# Patient Record
Sex: MALE | Race: WHITE | NOT HISPANIC OR LATINO | Employment: OTHER | URBAN - METROPOLITAN AREA
[De-identification: names, ages, dates, MRNs, and addresses within clinical notes are randomized per-mention and may not be internally consistent; named-entity substitution may affect disease eponyms.]

---

## 2018-08-14 ENCOUNTER — APPOINTMENT (OUTPATIENT)
Dept: LAB | Facility: CLINIC | Age: 73
End: 2018-08-14
Payer: COMMERCIAL

## 2018-08-14 ENCOUNTER — TRANSCRIBE ORDERS (OUTPATIENT)
Dept: ADMINISTRATIVE | Facility: HOSPITAL | Age: 73
End: 2018-08-14

## 2018-08-14 DIAGNOSIS — R60.0 LOCALIZED EDEMA: Primary | ICD-10-CM

## 2018-08-14 DIAGNOSIS — L02.519 CELLULITIS AND ABSCESS OF HAND, EXCEPT FINGERS AND THUMB: ICD-10-CM

## 2018-08-14 DIAGNOSIS — L03.119 CELLULITIS AND ABSCESS OF HAND, EXCEPT FINGERS AND THUMB: ICD-10-CM

## 2018-08-14 DIAGNOSIS — B99.9 DILATED CARDIOMYOPATHY SECONDARY TO INFECTION (HCC): ICD-10-CM

## 2018-08-14 DIAGNOSIS — R60.0 LOCALIZED EDEMA: ICD-10-CM

## 2018-08-14 DIAGNOSIS — I43 DILATED CARDIOMYOPATHY SECONDARY TO INFECTION (HCC): ICD-10-CM

## 2018-08-14 LAB
ALBUMIN SERPL BCP-MCNC: 3.5 G/DL (ref 3.5–5)
ALP SERPL-CCNC: 92 U/L (ref 46–116)
ALT SERPL W P-5'-P-CCNC: 40 U/L (ref 12–78)
ANION GAP SERPL CALCULATED.3IONS-SCNC: 6 MMOL/L (ref 4–13)
AST SERPL W P-5'-P-CCNC: 26 U/L (ref 5–45)
BASOPHILS # BLD AUTO: 0.04 THOUSANDS/ΜL (ref 0–0.1)
BASOPHILS NFR BLD AUTO: 1 % (ref 0–1)
BILIRUB SERPL-MCNC: 1 MG/DL (ref 0.2–1)
BUN SERPL-MCNC: 14 MG/DL (ref 5–25)
CALCIUM SERPL-MCNC: 9 MG/DL (ref 8.3–10.1)
CHLORIDE SERPL-SCNC: 103 MMOL/L (ref 100–108)
CHOLEST SERPL-MCNC: 176 MG/DL (ref 50–200)
CO2 SERPL-SCNC: 27 MMOL/L (ref 21–32)
CREAT SERPL-MCNC: 0.96 MG/DL (ref 0.6–1.3)
DEPRECATED D DIMER PPP: 2060 NG/ML (FEU) (ref 0–424)
EOSINOPHIL # BLD AUTO: 0.09 THOUSAND/ΜL (ref 0–0.61)
EOSINOPHIL NFR BLD AUTO: 1 % (ref 0–6)
ERYTHROCYTE [DISTWIDTH] IN BLOOD BY AUTOMATED COUNT: 13.5 % (ref 11.6–15.1)
GFR SERPL CREATININE-BSD FRML MDRD: 77 ML/MIN/1.73SQ M
GLUCOSE P FAST SERPL-MCNC: 91 MG/DL (ref 65–99)
HCT VFR BLD AUTO: 47.5 % (ref 36.5–49.3)
HDLC SERPL-MCNC: 45 MG/DL (ref 40–60)
HGB BLD-MCNC: 15.4 G/DL (ref 12–17)
IMM GRANULOCYTES # BLD AUTO: 0.03 THOUSAND/UL (ref 0–0.2)
IMM GRANULOCYTES NFR BLD AUTO: 0 % (ref 0–2)
LDLC SERPL CALC-MCNC: 102 MG/DL (ref 0–100)
LYMPHOCYTES # BLD AUTO: 1.57 THOUSANDS/ΜL (ref 0.6–4.47)
LYMPHOCYTES NFR BLD AUTO: 18 % (ref 14–44)
MCH RBC QN AUTO: 30.4 PG (ref 26.8–34.3)
MCHC RBC AUTO-ENTMCNC: 32.4 G/DL (ref 31.4–37.4)
MCV RBC AUTO: 94 FL (ref 82–98)
MONOCYTES # BLD AUTO: 0.57 THOUSAND/ΜL (ref 0.17–1.22)
MONOCYTES NFR BLD AUTO: 6 % (ref 4–12)
NEUTROPHILS # BLD AUTO: 6.54 THOUSANDS/ΜL (ref 1.85–7.62)
NEUTS SEG NFR BLD AUTO: 74 % (ref 43–75)
NONHDLC SERPL-MCNC: 131 MG/DL
NRBC BLD AUTO-RTO: 0 /100 WBCS
PLATELET # BLD AUTO: 189 THOUSANDS/UL (ref 149–390)
PMV BLD AUTO: 12.1 FL (ref 8.9–12.7)
POTASSIUM SERPL-SCNC: 4.2 MMOL/L (ref 3.5–5.3)
PROT SERPL-MCNC: 8.5 G/DL (ref 6.4–8.2)
PSA SERPL-MCNC: 0.5 NG/ML (ref 0–4)
RBC # BLD AUTO: 5.07 MILLION/UL (ref 3.88–5.62)
SODIUM SERPL-SCNC: 136 MMOL/L (ref 136–145)
TRIGL SERPL-MCNC: 143 MG/DL
WBC # BLD AUTO: 8.84 THOUSAND/UL (ref 4.31–10.16)

## 2018-08-14 PROCEDURE — 36415 COLL VENOUS BLD VENIPUNCTURE: CPT

## 2018-08-14 PROCEDURE — 86618 LYME DISEASE ANTIBODY: CPT

## 2018-08-14 PROCEDURE — 80053 COMPREHEN METABOLIC PANEL: CPT

## 2018-08-14 PROCEDURE — 80061 LIPID PANEL: CPT

## 2018-08-14 PROCEDURE — 85379 FIBRIN DEGRADATION QUANT: CPT

## 2018-08-14 PROCEDURE — 86617 LYME DISEASE ANTIBODY: CPT

## 2018-08-14 PROCEDURE — 84153 ASSAY OF PSA TOTAL: CPT

## 2018-08-14 PROCEDURE — 85025 COMPLETE CBC W/AUTO DIFF WBC: CPT

## 2018-08-15 ENCOUNTER — HOSPITAL ENCOUNTER (OUTPATIENT)
Dept: NON INVASIVE DIAGNOSTICS | Facility: CLINIC | Age: 73
Discharge: HOME | End: 2018-08-15
Payer: COMMERCIAL

## 2018-08-15 DIAGNOSIS — R60.0 LOCALIZED EDEMA: ICD-10-CM

## 2018-08-15 LAB
B BURGDOR IGG SER IA-ACNC: 6.1
B BURGDOR IGM SER IA-ACNC: 0.97

## 2018-08-15 PROCEDURE — 93971 EXTREMITY STUDY: CPT

## 2018-08-15 PROCEDURE — 93971 EXTREMITY STUDY: CPT | Performed by: SURGERY

## 2018-08-16 LAB
B BURGDOR IGG PATRN SER IB-IMP: NEGATIVE
B BURGDOR IGM PATRN SER IB-IMP: NEGATIVE
B BURGDOR18KD IGG SER QL IB: ABNORMAL
B BURGDOR23KD IGG SER QL IB: PRESENT
B BURGDOR23KD IGM SER QL IB: PRESENT
B BURGDOR28KD IGG SER QL IB: ABNORMAL
B BURGDOR30KD IGG SER QL IB: ABNORMAL
B BURGDOR39KD IGG SER QL IB: PRESENT
B BURGDOR39KD IGM SER QL IB: ABNORMAL
B BURGDOR41KD IGG SER QL IB: ABNORMAL
B BURGDOR41KD IGM SER QL IB: ABNORMAL
B BURGDOR45KD IGG SER QL IB: ABNORMAL
B BURGDOR58KD IGG SER QL IB: ABNORMAL
B BURGDOR66KD IGG SER QL IB: ABNORMAL
B BURGDOR93KD IGG SER QL IB: ABNORMAL

## 2024-03-31 ENCOUNTER — APPOINTMENT (EMERGENCY)
Dept: CT IMAGING | Facility: HOSPITAL | Age: 79
End: 2024-03-31
Payer: COMMERCIAL

## 2024-03-31 ENCOUNTER — HOSPITAL ENCOUNTER (OUTPATIENT)
Facility: HOSPITAL | Age: 79
Setting detail: OBSERVATION
Discharge: LEFT BEFORE DISCHARGE | End: 2024-03-31
Attending: EMERGENCY MEDICINE | Admitting: INTERNAL MEDICINE
Payer: COMMERCIAL

## 2024-03-31 VITALS
WEIGHT: 200.62 LBS | HEART RATE: 119 BPM | OXYGEN SATURATION: 94 % | SYSTOLIC BLOOD PRESSURE: 131 MMHG | TEMPERATURE: 98.3 F | DIASTOLIC BLOOD PRESSURE: 70 MMHG | RESPIRATION RATE: 19 BRPM

## 2024-03-31 DIAGNOSIS — R29.90 STROKE-LIKE SYMPTOMS: Primary | ICD-10-CM

## 2024-03-31 LAB
ANION GAP SERPL CALCULATED.3IONS-SCNC: 12 MMOL/L (ref 4–13)
APTT PPP: 28 SECONDS (ref 23–37)
BUN SERPL-MCNC: 18 MG/DL (ref 5–25)
CALCIUM SERPL-MCNC: 9.3 MG/DL (ref 8.4–10.2)
CARDIAC TROPONIN I PNL SERPL HS: 17 NG/L
CHLORIDE SERPL-SCNC: 103 MMOL/L (ref 96–108)
CO2 SERPL-SCNC: 23 MMOL/L (ref 21–32)
CREAT SERPL-MCNC: 1.11 MG/DL (ref 0.6–1.3)
ERYTHROCYTE [DISTWIDTH] IN BLOOD BY AUTOMATED COUNT: 13.4 % (ref 11.6–15.1)
FLUAV RNA RESP QL NAA+PROBE: NEGATIVE
FLUBV RNA RESP QL NAA+PROBE: NEGATIVE
GFR SERPL CREATININE-BSD FRML MDRD: 62 ML/MIN/1.73SQ M
GLUCOSE SERPL-MCNC: 149 MG/DL (ref 65–140)
GLUCOSE SERPL-MCNC: 170 MG/DL (ref 65–140)
HCT VFR BLD AUTO: 49.7 % (ref 36.5–49.3)
HGB BLD-MCNC: 16.1 G/DL (ref 12–17)
INR PPP: 1.03 (ref 0.84–1.19)
MCH RBC QN AUTO: 30.6 PG (ref 26.8–34.3)
MCHC RBC AUTO-ENTMCNC: 32.4 G/DL (ref 31.4–37.4)
MCV RBC AUTO: 94 FL (ref 82–98)
PLATELET # BLD AUTO: 190 THOUSANDS/UL (ref 149–390)
PMV BLD AUTO: 12.8 FL (ref 8.9–12.7)
POTASSIUM SERPL-SCNC: 3.5 MMOL/L (ref 3.5–5.3)
PROTHROMBIN TIME: 13.6 SECONDS (ref 11.6–14.5)
RBC # BLD AUTO: 5.27 MILLION/UL (ref 3.88–5.62)
RSV RNA RESP QL NAA+PROBE: NEGATIVE
SARS-COV-2 RNA RESP QL NAA+PROBE: NEGATIVE
SODIUM SERPL-SCNC: 138 MMOL/L (ref 135–147)
WBC # BLD AUTO: 11.94 THOUSAND/UL (ref 4.31–10.16)

## 2024-03-31 PROCEDURE — 85610 PROTHROMBIN TIME: CPT | Performed by: EMERGENCY MEDICINE

## 2024-03-31 PROCEDURE — 84484 ASSAY OF TROPONIN QUANT: CPT | Performed by: EMERGENCY MEDICINE

## 2024-03-31 PROCEDURE — 82948 REAGENT STRIP/BLOOD GLUCOSE: CPT

## 2024-03-31 PROCEDURE — 0241U HB NFCT DS VIR RESP RNA 4 TRGT: CPT | Performed by: EMERGENCY MEDICINE

## 2024-03-31 PROCEDURE — 36415 COLL VENOUS BLD VENIPUNCTURE: CPT | Performed by: EMERGENCY MEDICINE

## 2024-03-31 PROCEDURE — 80048 BASIC METABOLIC PNL TOTAL CA: CPT | Performed by: EMERGENCY MEDICINE

## 2024-03-31 PROCEDURE — 70498 CT ANGIOGRAPHY NECK: CPT

## 2024-03-31 PROCEDURE — 99285 EMERGENCY DEPT VISIT HI MDM: CPT

## 2024-03-31 PROCEDURE — 99285 EMERGENCY DEPT VISIT HI MDM: CPT | Performed by: EMERGENCY MEDICINE

## 2024-03-31 PROCEDURE — 70496 CT ANGIOGRAPHY HEAD: CPT

## 2024-03-31 PROCEDURE — 93005 ELECTROCARDIOGRAM TRACING: CPT

## 2024-03-31 PROCEDURE — 85027 COMPLETE CBC AUTOMATED: CPT | Performed by: EMERGENCY MEDICINE

## 2024-03-31 PROCEDURE — 85730 THROMBOPLASTIN TIME PARTIAL: CPT | Performed by: EMERGENCY MEDICINE

## 2024-03-31 RX ORDER — ASPIRIN 325 MG
325 TABLET ORAL ONCE
Status: DISCONTINUED | OUTPATIENT
Start: 2024-03-31 | End: 2024-03-31

## 2024-03-31 RX ORDER — CLOPIDOGREL BISULFATE 75 MG/1
300 TABLET ORAL ONCE
Status: DISCONTINUED | OUTPATIENT
Start: 2024-03-31 | End: 2024-03-31

## 2024-03-31 RX ORDER — ATORVASTATIN CALCIUM 40 MG/1
40 TABLET, FILM COATED ORAL EVERY EVENING
Status: DISCONTINUED | OUTPATIENT
Start: 2024-03-31 | End: 2024-03-31

## 2024-03-31 RX ORDER — HEPARIN SODIUM 5000 [USP'U]/ML
5000 INJECTION, SOLUTION INTRAVENOUS; SUBCUTANEOUS EVERY 8 HOURS SCHEDULED
Status: DISCONTINUED | OUTPATIENT
Start: 2024-03-31 | End: 2024-03-31

## 2024-03-31 RX ORDER — ASPIRIN 81 MG/1
81 TABLET, CHEWABLE ORAL DAILY
Status: DISCONTINUED | OUTPATIENT
Start: 2024-04-01 | End: 2024-03-31

## 2024-03-31 RX ADMIN — IOHEXOL 85 ML: 350 INJECTION, SOLUTION INTRAVENOUS at 20:01

## 2024-03-31 NOTE — Clinical Note
Case was discussed with mario and the patient's admission status was agreed to be Admission Status: observation status to the service of Dr. martin .

## 2024-03-31 NOTE — QUICK NOTE
NEUROLOGY STROKE ALERT TELEPHONE NOTE    I did not personally see or examine this patient at time of ED presentation, but I discussed the case in real time with the onsite ED provider including pertinent PMHx, presenting symptoms, initial vitals, exam findings, lab findings, neuroimaging findings, and made recommendations regarding further w/u and treatment. I also independently reviewed patient's EMR, vital signs, labs, and interpreted all neuroimaging studies personally as well as discussing them w/ reading radiologist. Please see my following comments for details and adjustments. Critical care time, excluding procedures, teaching, family meetings, and excludes any prior time recorded by providers other than myself, 45 minutes.     78-year-old male w/ no reported PMHx presents to [facility] ED w/ transient RUE weakness.  Reportedly around 17:00 patient developed right-sided weakness.  EMS reportedly noticed RUE drift.  By time of ED arrival symptoms were greatly improved.  Prehospital stroke alert activated at 19:30.  Neurology attempted response immediately, but did not get in touch with ED until 19:55 when patient finally arrived.    LKN: 17:00  AP/AC: None  Initial BP: 171/91  B  Initial NIHSS (per onsite ED provider): 0  CTH: No acute abnormality  CTA head/neck: No LVO    No TNK 2/2 nondisabling and rapidly resolving symptoms. No thrombectomy 2/2 no LVO.  Clinically, presentation is consistent with left hemispheric TIA or minor stroke.  ABCD2 score 6.  Load with DAPT (aspirin 325, Plavix 300) per CHANCE/POINT/INSPIRES trials and admit on stroke pathway.    Zev Oropeza MD  Neurology  24

## 2024-04-01 LAB
ATRIAL RATE: 127 BPM
P AXIS: 56 DEGREES
PR INTERVAL: 166 MS
QRS AXIS: 46 DEGREES
QRSD INTERVAL: 72 MS
QT INTERVAL: 308 MS
QTC INTERVAL: 444 MS
T WAVE AXIS: -43 DEGREES
VENTRICULAR RATE: 125 BPM

## 2024-04-01 PROCEDURE — 93010 ELECTROCARDIOGRAM REPORT: CPT | Performed by: INTERNAL MEDICINE

## 2024-04-01 NOTE — ED PROVIDER NOTES
History  Chief Complaint   Patient presents with    STROKE Alert     1800 started with R sided weakness.     78-year-old male presents with sudden onset right upper extremity weakness which started at 5 PM.  Patient initially said 6 PM but when he thought about he says probably closer to 5.  Says it since started to improve.  EMS reports right-sided pronator drift otherwise normal exam.      STROKE Alert      None       History reviewed. No pertinent past medical history.    History reviewed. No pertinent surgical history.    History reviewed. No pertinent family history.  I have reviewed and agree with the history as documented.    E-Cigarette/Vaping     E-Cigarette/Vaping Substances          Review of Systems    Physical Exam  Physical Exam  Vitals and nursing note reviewed.   Constitutional:       Appearance: Normal appearance. He is well-developed.   HENT:      Head: Normocephalic and atraumatic.   Eyes:      Conjunctiva/sclera: Conjunctivae normal.      Pupils: Pupils are equal, round, and reactive to light.   Neck:      Trachea: No tracheal deviation.   Cardiovascular:      Rate and Rhythm: Regular rhythm. Tachycardia present.      Heart sounds: Normal heart sounds. No murmur heard.  Pulmonary:      Effort: Pulmonary effort is normal. No respiratory distress.      Breath sounds: Normal breath sounds. No wheezing or rales.   Abdominal:      General: Bowel sounds are normal. There is no distension.      Palpations: Abdomen is soft.      Tenderness: There is no abdominal tenderness.   Musculoskeletal:         General: No deformity.      Cervical back: Normal range of motion and neck supple.   Skin:     General: Skin is warm and dry.      Capillary Refill: Capillary refill takes less than 2 seconds.   Neurological:      General: No focal deficit present.      Mental Status: He is alert and oriented to person, place, and time.      Sensory: No sensory deficit.   Psychiatric:         Mood and Affect: Mood normal.          Judgment: Judgment normal.         Vital Signs  ED Triage Vitals   Temperature Pulse Respirations Blood Pressure SpO2   03/31/24 2014 03/31/24 1949 03/31/24 1949 03/31/24 1949 03/31/24 1945   98.3 °F (36.8 °C) (!) 123 18 (!) 171/91 97 %      Temp src Heart Rate Source Patient Position - Orthostatic VS BP Location FiO2 (%)   -- 03/31/24 1949 03/31/24 1949 -- --    Monitor Lying        Pain Score       --                  Vitals:    03/31/24 1949 03/31/24 2018 03/31/24 2045   BP: (!) 171/91 168/96 131/70   Pulse: (!) 123 (!) 118 (!) 119   Patient Position - Orthostatic VS: Lying           Visual Acuity      ED Medications  Medications   aspirin tablet 325 mg (325 mg Oral Not Given 3/31/24 2056)   clopidogrel (PLAVIX) tablet 300 mg (300 mg Oral Not Given 3/31/24 2056)   iohexol (OMNIPAQUE) 350 MG/ML injection (SINGLE-DOSE) 85 mL (85 mL Intravenous Given 3/31/24 2001)       Diagnostic Studies  Results Reviewed       Procedure Component Value Units Date/Time    FLU/RSV/COVID - if FLU/RSV clinically relevant [[id number]]  (Normal) Collected: 03/31/24 2005    Lab Status: Final result Specimen: Nares from Nose Updated: 03/31/24 2053     SARS-CoV-2 Negative     INFLUENZA A PCR Negative     INFLUENZA B PCR Negative     RSV PCR Negative    Narrative:      FOR PEDIATRIC PATIENTS - copy/paste COVID Guidelines URL to browser: [email or web address]    SARS-CoV-2 assay is a Nucleic Acid Amplification assay intended for the  qualitative detection of nucleic acid from SARS-CoV-2 in nasopharyngeal  swabs. Results are for the presumptive identification of SARS-CoV-2 RNA.    Positive results are indicative of infection with SARS-CoV-2, the virus  causing COVID-19, but do not rule out bacterial infection or co-infection  with other viruses. Laboratories within the [place] and its  territories are required to report all positive results to the appropriate  public health  authorities. Negative results do not preclude SARS-CoV-2  infection and should not be used as the sole basis for treatment or other  patient management decisions. Negative results must be combined with  clinical observations, patient history, and epidemiological information.  This test has not been FDA cleared or approved.    This test has been authorized by FDA under an Emergency Use Authorization  (EUA). This test is only authorized for the duration of time the  declaration that circumstances exist justifying the authorization of the  emergency use of an in vitro diagnostic tests for detection of SARS-CoV-2  virus and/or diagnosis of COVID-19 infection under section 564(b)(1) of  the Act, 21 U.S.C. 360bbb-3(b)(1), unless the authorization is terminated  or revoked sooner. The test has been validated but independent review by FDA  and CLIA is pending.    Test performed using [employer]: This RT-PCR assay targets N2,  a region unique to SARS-CoV-2. A conserved region in the E-gene was chosen  for pan-Sarbecovirus detection which includes SARS-CoV-2.    According to CMS-2020-01-R, this platform meets the definition of high-throughput technology.    HS Troponin 0hr (reflex protocol) [[id number]]  (Normal) Collected: 03/31/24 1945    Lab Status: Final result Specimen: Blood from Arm, Left Updated: 03/31/24 2025     hs TnI 0hr 17 ng/L     HS Troponin I 2hr [[id number]]     Lab Status: No result Specimen: Blood     HS Troponin I 4hr [[id number]]     Lab Status: No result Specimen: Blood     Basic metabolic panel [[id number]]  (Abnormal) Collected: 03/31/24 1945    Lab Status: Final result Specimen: Blood from Arm, Left Updated: 03/31/24 2023     Sodium 138 mmol/L      Potassium 3.5 mmol/L      Chloride 103 mmol/L      CO2 23 mmol/L      ANION GAP 12 mmol/L      BUN 18 mg/dL      Creatinine 1.11 mg/dL      Glucose 170 mg/dL      Calcium 9.3 mg/dL      eGFR 62 ml/min/1.73sq m     Narrative:      National Kidney Disease  Foundation guidelines for Chronic Kidney Disease (CKD):     Stage 1 with normal or high GFR (GFR > 90 mL/min/1.73 square meters)    Stage 2 Mild CKD (GFR = 60-89 mL/min/1.73 square meters)    Stage 3A Moderate CKD (GFR = 45-59 mL/min/1.73 square meters)    Stage 3B Moderate CKD (GFR = 30-44 mL/min/1.73 square meters)    Stage 4 Severe CKD (GFR = 15-29 mL/min/1.73 square meters)    Stage 5 End Stage CKD (GFR <15 mL/min/1.73 square meters)  Note: GFR calculation is accurate only with a steady state creatinine    Protime-INR [[id number]]  (Normal) Collected: 03/31/24 1945    Lab Status: Final result Specimen: Blood from Arm, Left Updated: 03/31/24 2015     Protime 13.6 seconds      INR 1.03    APTT [[id number]]  (Normal) Collected: 03/31/24 1945    Lab Status: Final result Specimen: Blood from Arm, Left Updated: 03/31/24 2015     PTT 28 seconds     CBC and Platelet [[id number]]  (Abnormal) Collected: 03/31/24 1945    Lab Status: Final result Specimen: Blood from Arm, Left Updated: 03/31/24 2002     WBC 11.94 Thousand/uL      RBC 5.27 Million/uL      Hemoglobin 16.1 g/dL      Hematocrit 49.7 %      MCV 94 fL      MCH 30.6 pg      MCHC 32.4 g/dL      RDW 13.4 %      Platelets 190 Thousands/uL      MPV 12.8 fL     Fingerstick Glucose (POCT) [[id number]]  (Abnormal) Collected: 03/31/24 1950    Lab Status: Final result Specimen: Blood Updated: 03/31/24 1952     POC Glucose 149 mg/dl                    CTA stroke alert (head/neck)   Final Result by Wilman Schneider MD (03/31 2035)      CTA head:   -Motion degraded examination limiting evaluation.   -Moderate to severe left proximal supraclinoid stenosis due to atherosclerosis. This may be overestimated due to motion in this area.   -No large vessel occlusion or aneurysm.      CTA neck:   -Evaluation of the lower cervical vasculature is somewhat limited due to high image noise. Within these limits:   -No high-grade stenosis, dissection or aneurysm.      Other:   -Soft tissue nodule  measuring 1 cm within the left nasolabial fold. Clinical correlation advised            Findings were directly discussed with Zev Oropeza at 8:31 p.m. on 3/31/2024.      The study was marked in EPIC for immediate notification.                           Workstation performed: [id number]         CT stroke alert brain   Final Result by Wilman Schneider MD (03/31 2037)      -No acute vascular territory infarct or acute intracranial hemorrhage.   -Chronic microangiopathic changes.         Findings were directly discussed with Zev Oropeza at 8:31 p.m. on 3/31/2024.         Workstation performed: [id number]                    Procedures  Procedures         ED Course  ED Course as of 03/31/24 2126   Sun Mar 31, 2024   2001 NIH is 0, symptoms have improved and mostly resolved.  On exam he has no objective findings.  Not a TNK candidate.  He does have a high ABCD2 score, will require admission.   2011 Speaking with Dr. Oropeza, on-call for stroke neurology.    CT scan viewed and interpreted independent by me, no acute hemorrhage appreciated.   2011 Patient will likely require admission for stroke pathway   2032 Radiology have not yet read stroke alert. Dr Oropeza agrees no acute ich. Start aspirin, plavix, admit stroke pathway pending ct   2056 Patient is adamantly refusing admission.  I counseled patient extensively informed that he is high risk for having a stroke leading to permanent paralysis, including worsening symptoms, inability to talk, communicate, inability to care for himself or others, death, ventilator dependence, loss of limb, as well as any numerous other complications.  Patient understood this and repeated back to me.  He says that he wants to go home.  I more or less informed patient that I strongly believe he will have a bad outcome if he leaves the hospital.  Patient understood this and still wants to leave A.  Patient understands he is free to return whenever he wants.  He is AOx4, has capacity make his own  decisions and is not intoxicated.                               SBIRT 22yo+      Flowsheet Row Most Recent Value   Initial Alcohol Screen: US AUDIT-C     1. How often do you have a drink containing alcohol? 0 Filed at: 03/31/2024 1954   2. How many drinks containing alcohol do you have on a typical day you are drinking?  0 Filed at: 03/31/2024 1954   3a. Male UNDER 65: How often do you have five or more drinks on one occasion? 0 Filed at: 03/31/2024 1954   3b. FEMALE Any Age, or MALE 65+: How often do you have 4 or more drinks on one occassion? 0 Filed at: 03/31/2024 1954   Audit-C Score 0 Filed at: 03/31/2024 1954   YAZAN: How many times in the past year have you...    Used an illegal drug or used a prescription medication for non-medical reasons? Never Filed at: 03/31/2024 1954                      Medical Decision Making  78-year-old male presents with symptoms concerning for acute CVA.  Symptoms mostly resolved by the time he got to the emergency department and his NIHSS currently 0 and he is neurologically intact.  CT scan was negative.  I did discuss that this is likely TIA which places patient at a high risk of stroke which could lead to more severe, permanent symptoms including death.  I also told him that his heart rate is elevated and he will require cardiac monitoring, and trending of his cardiac enzymes.    Blood work overall reassuring, Trope does require repeat.  EKG is abnormal however not consistent with ACS.    Initially patient was agreeable to admission however shortly after placing the admission order patient changes mind.  I attempted again to convince patient to stay however he adamantly refused and said that there is no way staying in the hospital.  He understood the risks as well as the diagnoses I was concerned about.  He also understood the chest because his symptoms resolved does not mean that he does not have the potential become acutely ill.    Does not have a primary physician so a  referral was placed to family medicine.    Problems Addressed:  Stroke-like symptoms: acute illness or injury    Amount and/or Complexity of Data Reviewed  Labs: ordered. Decision-making details documented in ED Course.  Radiology: ordered and independent interpretation performed. Decision-making details documented in ED Course.    Risk  OTC drugs.  Prescription drug management.  Decision regarding hospitalization.             Disposition  Final diagnoses:   Stroke-like symptoms     Time reflects when diagnosis was documented in both MDM as applicable and the Disposition within this note       Time User Action Codes Description Comment    3/31/2024  7:53 PM Zackary Gunderson Add [R29.90] Stroke-like symptoms           ED Disposition       ED Disposition   AMA    Condition   --    Date/Time   Sun Mar 31, 2024 2056    Comment   Date: 3/31/2024  Patient: Rodrigo Real  Admitted: 3/31/2024  7:45 PM  Attending Provider: Yessi Singh, *    Rodrigo Real or his authorized caregiver has made the decision for the patient to leave the emergency department against the ad vice of his attending physician. He or his authorized caregiver has been informed and understands the inherent risks, including death, paralysis, pain, suffering, death, ventilator dependence, inability to care for self or others, inability to have a  happy or fulfilling life, as well as diagnostic uncertainty.  He or his authorized caregiver has decided to accept the responsibility for this decision. Rodrigo Real and all necessary parties have been advised that he may return for further evalua tion or treatment. His condition at time of discharge was guarded.  Rodrigo Real had current vital signs as follows:  /96   Pulse (!) 118   Temp 98.3 °F (36.8 °C)   Resp 20   Wt 91 kg (200 lb 9.9 oz)                Follow-up Information       Follow up With Specialties Details Why Contact Info Additional Information    [facility]  Family Practice Family Medicine In 1 day As needed [address] [id number] [phone] [facility], [address], [place], [place], [address], [phone]            Patient's Medications    No medications on file           PDMP Review       None            ED Provider  Electronically Signed by             Zackary Gunderson,   03/31/24 2120

## 2024-04-01 NOTE — DISCHARGE INSTRUCTIONS
You are leaving AGAINST MEDICAL ADVICE.  You are at high risk for stroke leading to permanent paralysis, death, and other complications that we talked about.  You understood these complications and still want to leave AGAINST MEDICAL ADVICE.  Follow-up with a family physician or return to the emergency department if you change your mind.